# Patient Record
Sex: MALE | Race: WHITE | NOT HISPANIC OR LATINO | URBAN - METROPOLITAN AREA
[De-identification: names, ages, dates, MRNs, and addresses within clinical notes are randomized per-mention and may not be internally consistent; named-entity substitution may affect disease eponyms.]

---

## 2018-03-28 ENCOUNTER — HOSPITAL ENCOUNTER (EMERGENCY)
Facility: HOSPITAL | Age: 53
Discharge: HOME | End: 2018-03-28
Attending: EMERGENCY MEDICINE
Payer: OTHER GOVERNMENT

## 2018-03-28 VITALS
DIASTOLIC BLOOD PRESSURE: 93 MMHG | HEART RATE: 63 BPM | RESPIRATION RATE: 18 BRPM | TEMPERATURE: 99 F | OXYGEN SATURATION: 99 % | WEIGHT: 215 LBS | SYSTOLIC BLOOD PRESSURE: 172 MMHG

## 2018-03-28 DIAGNOSIS — L01.00 IMPETIGO: ICD-10-CM

## 2018-03-28 DIAGNOSIS — B02.9 HERPES ZOSTER WITHOUT COMPLICATION: Primary | ICD-10-CM

## 2018-03-28 PROCEDURE — 25000003 PHARM REV CODE 250: Performed by: NURSE PRACTITIONER

## 2018-03-28 PROCEDURE — 99284 EMERGENCY DEPT VISIT MOD MDM: CPT

## 2018-03-28 RX ORDER — CLINDAMYCIN HYDROCHLORIDE 150 MG/1
300 CAPSULE ORAL 4 TIMES DAILY
Qty: 56 CAPSULE | Refills: 0 | Status: SHIPPED | OUTPATIENT
Start: 2018-03-28 | End: 2018-04-04

## 2018-03-28 RX ORDER — VALACYCLOVIR HYDROCHLORIDE 500 MG/1
1000 TABLET, FILM COATED ORAL
Status: COMPLETED | OUTPATIENT
Start: 2018-03-28 | End: 2018-03-28

## 2018-03-28 RX ORDER — CLINDAMYCIN HYDROCHLORIDE 150 MG/1
300 CAPSULE ORAL
Status: COMPLETED | OUTPATIENT
Start: 2018-03-28 | End: 2018-03-28

## 2018-03-28 RX ORDER — VALACYCLOVIR HYDROCHLORIDE 1 G/1
1000 TABLET, FILM COATED ORAL 3 TIMES DAILY
Qty: 21 TABLET | Refills: 0 | Status: SHIPPED | OUTPATIENT
Start: 2018-03-28 | End: 2018-04-04

## 2018-03-28 RX ORDER — ACYCLOVIR 800 MG/1
800 TABLET ORAL
Status: DISCONTINUED | OUTPATIENT
Start: 2018-03-28 | End: 2018-03-28

## 2018-03-28 RX ORDER — ATENOLOL 25 MG/1
25 TABLET ORAL DAILY
COMMUNITY

## 2018-03-28 RX ADMIN — VALACYCLOVIR HYDROCHLORIDE 1000 MG: 500 TABLET, FILM COATED ORAL at 11:03

## 2018-03-28 RX ADMIN — CLINDAMYCIN HYDROCHLORIDE 300 MG: 150 CAPSULE ORAL at 11:03

## 2018-03-28 NOTE — ED NOTES
Pt with rash to left side of neck down to left anterior chest wall. Also has additional spots to right side of face and scalp.

## 2018-03-28 NOTE — ED PROVIDER NOTES
Encounter Date: 3/28/2018    SCRIBE #1 NOTE: I, Deo Dariusz, am scribing for, and in the presence of, Evie SEPULVEDA.       History     Chief Complaint   Patient presents with    Rash     Pt has a rash to left side of neck that extends onto left shoulder after being bitten by an unknown insect Saturday 03/28/2018 11:30 AM     Chief complaint: Rash.       Joel Null is a 52 y.o. male with a PMHx of HTN who presents to the ED with erythematous rash on the left side of his neck with onset 4 days ago. Patient states that he awoke on Saturday 4 insect like bites in a row on his left side of his neck. He relays that they began to swell and become more erythematous since onset. He admits that the swelling has reduced drastically, but they have began to harden. Patient admits that the rash has migrated up his face and skull, in addition to being on his left shoulder. He does endorse some yellowish clear drainage that is starting to crust over. He admits that for the last 2 days he has been having shooting pain from the rash. Patient reports that he has had chicken pocks in the past. Patient denies fever, vocal changes, SOB, difficulty swallowing, visual changes, headache, chest pain, dysuria, and abdominal pain.              The history is provided by the patient.     Review of patient's allergies indicates:  No Known Allergies  Past Medical History:   Diagnosis Date    Hypertension      No past surgical history on file.  No family history on file.  Social History   Substance Use Topics    Smoking status: Not on file    Smokeless tobacco: Not on file    Alcohol use Not on file     Review of Systems   Constitutional: Negative for fever.   HENT: Negative for sore throat, trouble swallowing and voice change.    Respiratory: Negative for cough and shortness of breath.    Cardiovascular: Negative for chest pain.   Gastrointestinal: Negative for nausea.   Genitourinary: Negative for dysuria.   Musculoskeletal:  Negative for back pain.   Skin: Positive for rash.   Neurological: Negative for weakness.   Hematological: Does not bruise/bleed easily.   All other systems reviewed and are negative.      Physical Exam     Initial Vitals [03/28/18 1127]   BP Pulse Resp Temp SpO2   (!) 172/93 63 18 98.5 °F (36.9 °C) 99 %      MAP       119.33         Physical Exam    Nursing note and vitals reviewed.  Constitutional: Vital signs are normal. He appears well-developed and well-nourished.   HENT:   Head: Normocephalic and atraumatic.   Mouth/Throat: Oropharynx is clear and moist.   Eyes: Pupils are equal, round, and reactive to light.   Neck: Trachea normal, normal range of motion, full passive range of motion without pain and phonation normal. Neck supple. No stridor present. No tracheal tenderness present. No tracheal deviation present.   Cardiovascular: Normal rate, regular rhythm, normal heart sounds and intact distal pulses. Exam reveals no gallop and no friction rub.    No murmur heard.  Pulmonary/Chest: He has no wheezes. He has no rhonchi. He has no rales.   Abdominal: Soft. Normal appearance and bowel sounds are normal.   Neurological: He is alert and oriented to person, place, and time. He has normal strength.   Skin: Skin is warm, dry and intact. Rash noted.   Erythematous vasicular rash with serous crusting and weeping to the left shoulder neck and right cheek and forehead.   No occular involvement.    Psychiatric: He has a normal mood and affect. His speech is normal and behavior is normal.         ED Course   Procedures  Labs Reviewed - No data to display          Medical Decision Making:   Differential Diagnosis:   Shingles  Impetigo  Cellulitis          APC / Resident Notes:   Patient is a 52 y.o. male who presents to the ED 03/28/2018 underwent emergent evaluation for rash.  Is not septic or toxic appearing.  Vital signs normal.  Patient is afebrile.  Not diabetic or immunocompromised.  He has vesicular weeping rash  with erythematous base on left shoulder neck and right cheek.  He should also states that the rash is painful, sharp and shooting like; rash is consistent with shingles with the exception of crossing 2 dermatomes.  Patient possibly has an secondary bacterial infection.  Patient treated with antivirals as well as antibiotics for shingles and impetigo.  Rash does not appear life threatening such as SJS or TEN.  Based on my clinical evaluation, I do not appreciate any immediate, emergent, or life threatening condition or etiology that warrants additional workup today and feel that the patient can be discharged with close follow up care. Case discussed with Dr. Lynne who is agreeable to plan of care. Follow up and return precautions discussed; patient verbalized understanding and is agreeable to plan of care. Patient discharged home in stable condition.               Scribe Attestation:   Scribe #1: I performed the above scribed service and the documentation accurately describes the services I performed. I attest to the accuracy of the note.    Attending Attestation:           Physician Attestation for Scribe:  Physician Attestation Statement for Scribe #1: I, Evie Moura, reviewed documentation, as scribed by in my presence, and it is both accurate and complete.     Comments: I, DERICK Gonzales, personally performed the services described in this documentation. All medical record entries made by the scribe were at my direction and in my presence.  I have reviewed the chart and agree that the record reflects my personal performance and is accurate and complete. DERICK Gonzales.  5:48 PM 03/28/2018                Clinical Impression:   The primary encounter diagnosis was Herpes zoster without complication. A diagnosis of Impetigo was also pertinent to this visit.    Disposition:   Disposition: Discharged  Condition: Stable                        Evie Moura NP  03/28/18 4285